# Patient Record
(demographics unavailable — no encounter records)

---

## 2024-11-25 NOTE — PHYSICAL EXAM
[Alert] : alert [EOMI] : grossly EOMI [Clear] : right tympanic membrane clear [Acute Distress] : no acute distress [Tenderness] : no tenderness [Pink Nasal Mucosa] : nasal mucosa not pink [Erythematous Oropharynx] : nonerythematous oropharynx

## 2024-11-25 NOTE — DISCUSSION/SUMMARY
[FreeTextEntry1] : MARGARETTE CONLEY  presents today with viral syndrome, he is well appearing with no pertinent findings on physical exam. Encouraged antipyretics and symptomatic treatment. Was instructed to follow up with no improvement in 2 days.

## 2024-11-25 NOTE — HISTORY OF PRESENT ILLNESS
[FreeTextEntry6] : MARGARETTE presents today with cough. She was seen one week ago with similar symptoms.

## 2024-12-06 NOTE — DISCUSSION/SUMMARY
[] : The components of the vaccine(s) to be administered today are listed in the plan of care. The disease(s) for which the vaccine(s) are intended to prevent and the risks have been discussed with the caretaker.  The risks are also included in the appropriate vaccination information statements which have been provided to the patient's caregiver.  The caregiver has given consent to vaccinate. [FreeTextEntry1] : 4 year well child   Failed vision screening-Optho referral  Vaccine: MMR & Varicella

## 2024-12-06 NOTE — PHYSICAL EXAM

## 2025-04-28 NOTE — DISCUSSION/SUMMARY
[FreeTextEntry1] : AZAI presents today with an allergic reaction and will be treated with eye drops and allergy medications.

## 2025-04-28 NOTE — PHYSICAL EXAM
[Acute Distress] : no acute distress [Alert] : not alert [Tenderness] : no tenderness [EOMI] : grossly EOMI [Clear] : right tympanic membrane clear [Pink Nasal Mucosa] : nasal mucosa not pink [de-identified] : Raised erythematous macules on neck and face

## 2025-04-28 NOTE — PHYSICAL EXAM
[Acute Distress] : no acute distress [Alert] : not alert [Tenderness] : no tenderness [EOMI] : grossly EOMI [Clear] : right tympanic membrane clear [Pink Nasal Mucosa] : nasal mucosa not pink [de-identified] : Raised erythematous macules on neck and face